# Patient Record
Sex: MALE | Race: BLACK OR AFRICAN AMERICAN | HISPANIC OR LATINO | URBAN - METROPOLITAN AREA
[De-identification: names, ages, dates, MRNs, and addresses within clinical notes are randomized per-mention and may not be internally consistent; named-entity substitution may affect disease eponyms.]

---

## 2020-11-06 ENCOUNTER — OUTPATIENT (OUTPATIENT)
Dept: OUTPATIENT SERVICES | Facility: HOSPITAL | Age: 40
LOS: 1 days | Discharge: HOME | End: 2020-11-06

## 2020-11-09 DIAGNOSIS — K02.63 DENTAL CARIES ON SMOOTH SURFACE PENETRATING INTO PULP: ICD-10-CM

## 2021-04-19 ENCOUNTER — OUTPATIENT (OUTPATIENT)
Dept: OUTPATIENT SERVICES | Facility: HOSPITAL | Age: 41
LOS: 1 days | Discharge: HOME | End: 2021-04-19

## 2021-04-20 DIAGNOSIS — K02.9 DENTAL CARIES, UNSPECIFIED: ICD-10-CM

## 2023-02-07 ENCOUNTER — EMERGENCY (EMERGENCY)
Facility: HOSPITAL | Age: 43
LOS: 0 days | Discharge: LEFT BEFORE DISCHARGE | End: 2023-02-07
Attending: EMERGENCY MEDICINE
Payer: MEDICAID

## 2023-02-07 VITALS
HEIGHT: 66 IN | SYSTOLIC BLOOD PRESSURE: 160 MMHG | DIASTOLIC BLOOD PRESSURE: 94 MMHG | WEIGHT: 147.93 LBS | TEMPERATURE: 97 F | OXYGEN SATURATION: 98 % | RESPIRATION RATE: 17 BRPM | HEART RATE: 117 BPM

## 2023-02-07 DIAGNOSIS — Z53.21 PROCEDURE AND TREATMENT NOT CARRIED OUT DUE TO PATIENT LEAVING PRIOR TO BEING SEEN BY HEALTH CARE PROVIDER: ICD-10-CM

## 2023-02-07 DIAGNOSIS — R00.2 PALPITATIONS: ICD-10-CM

## 2023-02-07 PROCEDURE — 99284 EMERGENCY DEPT VISIT MOD MDM: CPT

## 2023-02-07 PROCEDURE — 99281 EMR DPT VST MAYX REQ PHY/QHP: CPT

## 2023-02-07 PROCEDURE — 99053 MED SERV 10PM-8AM 24 HR FAC: CPT

## 2023-02-07 PROCEDURE — 93005 ELECTROCARDIOGRAM TRACING: CPT

## 2023-02-07 PROCEDURE — 93010 ELECTROCARDIOGRAM REPORT: CPT

## 2023-02-07 RX ORDER — SODIUM CHLORIDE 9 MG/ML
2000 INJECTION INTRAMUSCULAR; INTRAVENOUS; SUBCUTANEOUS ONCE
Refills: 0 | Status: DISCONTINUED | OUTPATIENT
Start: 2023-02-07 | End: 2023-02-07

## 2023-02-07 NOTE — ED ADULT NURSE REASSESSMENT NOTE - NS ED NURSE REASSESS COMMENT FT1
Patient eloped, patient states "I don't want to wait anymore." Patient Aox3, walking with steady gait, speaking clear sentences. Patient educated on importance of remaining in ED but refuses despite education. JAVIER love

## 2023-02-08 NOTE — ED PROVIDER NOTE - PROGRESS NOTE DETAILS
Informed by Charge Nurse Sammy - patient did not want to stay and left ER with friend. Unable to locate patient at beside or in waiting are.
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
## 2023-09-02 ENCOUNTER — EMERGENCY (EMERGENCY)
Facility: HOSPITAL | Age: 43
LOS: 0 days | Discharge: HOME | End: 2023-09-02
Attending: EMERGENCY MEDICINE
Payer: MEDICAID

## 2023-09-02 VITALS
TEMPERATURE: 97 F | WEIGHT: 143.08 LBS | OXYGEN SATURATION: 99 % | SYSTOLIC BLOOD PRESSURE: 119 MMHG | DIASTOLIC BLOOD PRESSURE: 78 MMHG | HEIGHT: 66 IN | HEART RATE: 75 BPM | RESPIRATION RATE: 18 BRPM
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
## 2023-09-02 DIAGNOSIS — S05.02XA INJURY OF CONJUNCTIVA AND CORNEAL ABRASION WITHOUT FOREIGN BODY, LEFT EYE, INITIAL ENCOUNTER: ICD-10-CM

## 2023-09-02 DIAGNOSIS — H57.12 OCULAR PAIN, LEFT EYE: ICD-10-CM
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
## 2023-09-02 DIAGNOSIS — Z23 ENCOUNTER FOR IMMUNIZATION: ICD-10-CM

## 2023-09-02 PROCEDURE — 90715 TDAP VACCINE 7 YRS/> IM: CPT

## 2023-09-02 PROCEDURE — 99283 EMERGENCY DEPT VISIT LOW MDM: CPT | Mod: 25

## 2023-09-02 PROCEDURE — 99284 EMERGENCY DEPT VISIT MOD MDM: CPT

## 2023-09-02 PROCEDURE — 90471 IMMUNIZATION ADMIN: CPT

## 2023-09-02 RX ORDER — CIPROFLOXACIN HCL 0.3 %
2 DROPS OPHTHALMIC (EYE)
Qty: 1 | Refills: 0
Start: 2023-09-02 | End: 2023-09-06

## 2023-09-02 RX ORDER — FLUORESCEIN SODIUM 9 MG
1 STRIP OPHTHALMIC (EYE) ONCE
Refills: 0 | Status: COMPLETED | OUTPATIENT
Start: 2023-09-02 | End: 2023-09-02

## 2023-09-02 RX ORDER — TETANUS TOXOID, REDUCED DIPHTHERIA TOXOID AND ACELLULAR PERTUSSIS VACCINE, ADSORBED 5; 2.5; 8; 8; 2.5 [IU]/.5ML; [IU]/.5ML; UG/.5ML; UG/.5ML; UG/.5ML
0.5 SUSPENSION INTRAMUSCULAR ONCE
Refills: 0 | Status: COMPLETED | OUTPATIENT
Start: 2023-09-02 | End: 2023-09-02

## 2023-09-02 RX ADMIN — Medication 1 APPLICATION(S): at 14:44

## 2023-09-02 RX ADMIN — TETANUS TOXOID, REDUCED DIPHTHERIA TOXOID AND ACELLULAR PERTUSSIS VACCINE, ADSORBED 0.5 MILLILITER(S): 5; 2.5; 8; 8; 2.5 SUSPENSION INTRAMUSCULAR at 14:41

## 2023-09-02 RX ADMIN — Medication 1 DROP(S): at 14:44

## 2023-09-02 NOTE — ED PROVIDER NOTE - PATIENT PORTAL LINK FT
You can access the FollowMyHealth Patient Portal offered by [facility] by registering at the following website: [email or web address]. By joining [employer]’s FollowMyHealth portal, you will also be able to view your health information using other applications (apps) compatible with our system.

## 2023-09-02 NOTE — ED PROVIDER NOTE - PHYSICAL EXAMINATION
CONSTITUTIONAL: nontoxic appearing, in no acute distress  HEAD:  normocephalic, atraumatic  EYES:  no conjunctival injection, no eye discharge, tracking well; L large corneal abrasion w/o Solis sign; OU 20/20; peripheral vision intact.   ENT:  tympanic membranes intact bilaterally, moist mucous membranes, no oropharyngeal ulcerations or lesions, no tonsillar swelling or erythema, no tonsillar exudates  NECK:  supple, no masses, no tender anterior/posterior cervical lymphadenopathy  CV:  regular rate and rhythm, cap refill < 2 seconds  RESP:  normal respiratory effort, lungs clear to auscultation bilaterally, no wheezes, no crackles, no retractions, no stridor  ABD:  soft, nontender, nondistended, no masses, no organomegaly  LYMPH:  no significant lymphadenopathy  MSK/NEURO:  normal movement, normal tone  SKIN:  warm, dry, no rash

## 2023-09-02 NOTE — ED PROVIDER NOTE - CLINICAL SUMMARY MEDICAL DECISION MAKING FREE TEXT BOX
Tetanus updated.  Will start antibiotic eyedrops.  Importance of follow-up with ophthalmologist discussed with patient.  Patient to return if any worsening symptoms or concerns.

## 2023-09-02 NOTE — ED PROVIDER NOTE - CARE PLAN
1 Principal Discharge DX:	Corneal abrasion   Principal Discharge DX:	Corneal abrasion  Secondary Diagnosis:	Need for tetanus booster
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
## 2023-09-02 NOTE — ED ADULT NURSE NOTE - NSFALLUNIVINTERV_ED_ALL_ED
Bed/Stretcher in lowest position, wheels locked, appropriate side rails in place/Call bell, personal items and telephone in reach/Instruct patient to call for assistance before getting out of bed/chair/stretcher/Non-slip footwear applied when patient is off stretcher/[place] to call system/Physically safe environment - no spills, clutter or unnecessary equipment/Purposeful proactive rounding/Room/bathroom lighting operational, light cord in reach
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
## 2023-09-02 NOTE — ED PROVIDER NOTE - IV ALTEPLASE INCLUSION HIDDEN
show [Muscle Pain] : muscle pain [Negative] : Heme/Lymph [Joint Pain] : no joint pain [Joint Stiffness] : no joint stiffness [Muscle Weakness] : no muscle weakness [Back Pain] : no back pain [Joint Swelling] : no joint swelling

## 2023-11-08 ENCOUNTER — EMERGENCY (EMERGENCY)
Facility: HOSPITAL | Age: 43
LOS: 0 days | Discharge: HOME | End: 2023-11-08
Attending: EMERGENCY MEDICINE
Payer: MEDICAID

## 2023-11-08 VITALS
OXYGEN SATURATION: 99 % | DIASTOLIC BLOOD PRESSURE: 95 MMHG | TEMPERATURE: 97 F | HEART RATE: 77 BPM | SYSTOLIC BLOOD PRESSURE: 133 MMHG | WEIGHT: 149.91 LBS | RESPIRATION RATE: 18 BRPM | HEIGHT: 66 IN
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
## 2023-11-08 DIAGNOSIS — S05.02XA INJURY OF CONJUNCTIVA AND CORNEAL ABRASION WITHOUT FOREIGN BODY, LEFT EYE, INITIAL ENCOUNTER: ICD-10-CM

## 2023-11-08 DIAGNOSIS — H57.12 OCULAR PAIN, LEFT EYE: ICD-10-CM
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
## 2023-11-08 PROBLEM — Z78.9 OTHER SPECIFIED HEALTH STATUS: Chronic | Status: ACTIVE | Noted: 2023-09-02

## 2023-11-08 PROCEDURE — 99283 EMERGENCY DEPT VISIT LOW MDM: CPT

## 2023-11-08 PROCEDURE — 99284 EMERGENCY DEPT VISIT MOD MDM: CPT

## 2023-11-08 RX ORDER — CIPROFLOXACIN HCL 0.3 %
1 DROPS OPHTHALMIC (EYE) ONCE
Refills: 0 | Status: COMPLETED | OUTPATIENT
Start: 2023-11-08 | End: 2023-11-08

## 2023-11-08 RX ORDER — POLYMYXIN B SULF/TRIMETHOPRIM 10000-1/ML
1 DROPS OPHTHALMIC (EYE) ONCE
Refills: 0 | Status: DISCONTINUED | OUTPATIENT
Start: 2023-11-08 | End: 2023-11-08

## 2023-11-08 RX ADMIN — Medication 1 DROP(S): at 04:42

## 2023-11-08 NOTE — ED PROVIDER NOTE - NSFOLLOWUPINSTRUCTIONS_ED_ALL_ED_FT
APPLY ONE DROP TO LEFT EYE EVERY 3 HOURS WHILE AWAKE FOR 7 DAYS.     Corneal Abrasion    The cornea is the clear covering at the front and center of the eye. This very thin tissue is made up of many layers. If a scratch or injury causes the corneal epithelium to come off, it is called a corneal abrasion. Symptoms include eye pain, redness, tearing, difficulty keeping eye open, and light sensitivity. Do not drive or operate machinery if your eye is patched.  Antibiotic eye drops may be prescribed to reduce the risk of infection.  It is important to follow up with an ophthalmologist (eye doctor) to ensure proper healing.    SEEK IMMEDIATE MEDICAL CARE IF YOU HAVE ANY OF THE FOLLOWING SYMPTOMS: discharge from eyes, changes in vision, fever, or swelling. APPLY ONE DROP TO LEFT EYE EVERY 3 HOURS WHILE AWAKE FOR 7 DAYS.     Our Emergency Department Referral Coordinators will be reaching out to you in the next 24-48 hours from 9:00am to 5:00pm with a follow up appointment. Please expect a phone call from the hospital in that time frame. If you do not receive a call or if you have any questions or concerns, you can reach them at   [phone]     Corneal Abrasion    The cornea is the clear covering at the front and center of the eye. This very thin tissue is made up of many layers. If a scratch or injury causes the corneal epithelium to come off, it is called a corneal abrasion. Symptoms include eye pain, redness, tearing, difficulty keeping eye open, and light sensitivity. Do not drive or operate machinery if your eye is patched.  Antibiotic eye drops may be prescribed to reduce the risk of infection.  It is important to follow up with an ophthalmologist (eye doctor) to ensure proper healing.    SEEK IMMEDIATE MEDICAL CARE IF YOU HAVE ANY OF THE FOLLOWING SYMPTOMS: discharge from eyes, changes in vision, fever, or swelling. APPLY TWO DROPS TO LEFT EYE EVERY 6 HOURS FOR 7 DAYS.     Our Emergency Department Referral Coordinators will be reaching out to you in the next 24-48 hours from 9:00am to 5:00pm with a follow up appointment. Please expect a phone call from the hospital in that time frame. If you do not receive a call or if you have any questions or concerns, you can reach them at   [phone]     Corneal Abrasion    The cornea is the clear covering at the front and center of the eye. This very thin tissue is made up of many layers. If a scratch or injury causes the corneal epithelium to come off, it is called a corneal abrasion. Symptoms include eye pain, redness, tearing, difficulty keeping eye open, and light sensitivity. Do not drive or operate machinery if your eye is patched.  Antibiotic eye drops may be prescribed to reduce the risk of infection.  It is important to follow up with an ophthalmologist (eye doctor) to ensure proper healing.    SEEK IMMEDIATE MEDICAL CARE IF YOU HAVE ANY OF THE FOLLOWING SYMPTOMS: discharge from eyes, changes in vision, fever, or swelling.

## 2023-11-08 NOTE — ED PROVIDER NOTE - OBJECTIVE STATEMENT
42-year-old male with no significant past medical history presents to the ED complaining of left eye pain woke him up from sleep today.  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  He states pain is similar to her prior episode where he had corneal abrasion.  He did not take any medication to improve symptoms.  He denies other complaints.  Tetanus is up-to-date. Pt denies fever, chills, headahce, dizziness, visual changes.

## 2023-11-08 NOTE — ED PROVIDER NOTE - NSFOLLOWUPCLINICS_GEN_ALL_ED_FT
[facility]  Ophthalmolgy  [address], [address]  [place] [address]  Phone: [phone]  Fax:   Follow Up Time: Urgent

## 2023-11-08 NOTE — ED PROVIDER NOTE - CLINICAL SUMMARY MEDICAL DECISION MAKING FREE TEXT BOX
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   Verbal instructions given, including instructions to return to ED immediately for any new, worsening, or concerning symptoms. Limitations of ED work up discussed.  Patient reports understanding of above with capacity and insight. Written discharge instructions additionally given, including follow-up plan.

## 2023-11-08 NOTE — ED ADULT TRIAGE NOTE - GLASGOW COMA SCALE: BEST MOTOR RESPONSE, MLM
(M6) obeys commands Calcipotriene Pregnancy And Lactation Text: The use of this medication during pregnancy or lactation is not recommended as there is insufficient data.

## 2023-11-08 NOTE — ED PROVIDER NOTE - PHYSICAL EXAMINATION
VITAL SIGNS: I have reviewed nursing notes and confirm.  CONSTITUTIONAL: Well-developed; well-nourished; in no acute distress.  SKIN: Skin exam is warm and dry, no acute rash.  HEAD: Normocephalic; atraumatic.  EYES: PERRL, EOM intact; (+) large corneal abrasion to L eye from 2-5 o'clock, negative Solis, small hair--removed.   ENT: No nasal discharge; airway clear.   EXT: Normal ROM.   NEURO: Alert, oriented. Grossly unremarkable. No focal deficits.

## 2023-11-08 NOTE — ED PROVIDER NOTE - PATIENT PORTAL LINK FT
You can access the FollowMyHealth Patient Portal offered by [facility] by registering at the following website: [email or web address]. By joining [employer]’s FollowMyHealth portal, you will also be able to view your health information using other applications (apps) compatible with our system.

## 2023-11-08 NOTE — ED ADULT NURSE NOTE - NSFALLUNIVINTERV_ED_ALL_ED
Bed/Stretcher in lowest position, wheels locked, appropriate side rails in place/Call bell, personal items and telephone in reach/Instruct patient to call for assistance before getting out of bed/chair/stretcher/Non-slip footwear applied when patient is off stretcher/[place] to call system/Physically safe environment - no spills, clutter or unnecessary equipment/Purposeful proactive rounding/Room/bathroom lighting operational, light cord in reach

## 2023-11-09 NOTE — CHART NOTE - NSCHARTNOTEFT_GEN_A_CORE
[facility] MRN [id number] / Sent to eye clinic 11/8 - JL / Clinic cannot see patient as they would fall under workers comp 11/9 - JL    Specialty: opthalmology

## 2024-03-15 ENCOUNTER — OUTPATIENT (OUTPATIENT)
Dept: OUTPATIENT SERVICES | Facility: HOSPITAL | Age: 44
LOS: 1 days | End: 2024-03-15
Payer: COMMERCIAL

## 2024-03-15 ENCOUNTER — APPOINTMENT (OUTPATIENT)
Dept: OPHTHALMOLOGY | Facility: CLINIC | Age: 44
End: 2024-03-15
Payer: COMMERCIAL

## 2024-03-15 DIAGNOSIS — H53.8 OTHER VISUAL DISTURBANCES: ICD-10-CM

## 2024-03-15 PROBLEM — Z00.00 ENCOUNTER FOR PREVENTIVE HEALTH EXAMINATION: Status: ACTIVE | Noted: 2024-03-15

## 2024-03-15 PROCEDURE — 92002 INTRM OPH EXAM NEW PATIENT: CPT

## 2024-03-22 ENCOUNTER — APPOINTMENT (OUTPATIENT)
Dept: OPHTHALMOLOGY | Facility: CLINIC | Age: 44
End: 2024-03-22
Payer: COMMERCIAL

## 2024-03-22 ENCOUNTER — OUTPATIENT (OUTPATIENT)
Dept: OUTPATIENT SERVICES | Facility: HOSPITAL | Age: 44
LOS: 1 days | End: 2024-03-22
Payer: COMMERCIAL

## 2024-03-22 DIAGNOSIS — H00.021 HORDEOLUM INTERNUM RIGHT UPPER EYELID: ICD-10-CM

## 2024-03-22 DIAGNOSIS — H53.8 OTHER VISUAL DISTURBANCES: ICD-10-CM

## 2024-03-22 PROCEDURE — 92012 INTRM OPH EXAM EST PATIENT: CPT

## 2024-03-28 DIAGNOSIS — H00.021 HORDEOLUM INTERNUM RIGHT UPPER EYELID: ICD-10-CM

## 2024-05-28 ENCOUNTER — APPOINTMENT (OUTPATIENT)
Dept: OPHTHALMOLOGY | Facility: CLINIC | Age: 44
End: 2024-05-28